# Patient Record
Sex: FEMALE | Race: WHITE | NOT HISPANIC OR LATINO
[De-identification: names, ages, dates, MRNs, and addresses within clinical notes are randomized per-mention and may not be internally consistent; named-entity substitution may affect disease eponyms.]

---

## 2021-12-03 ENCOUNTER — TRANSCRIPTION ENCOUNTER (OUTPATIENT)
Age: 66
End: 2021-12-03

## 2021-12-09 PROBLEM — Z00.00 ENCOUNTER FOR PREVENTIVE HEALTH EXAMINATION: Status: ACTIVE | Noted: 2021-12-09

## 2021-12-16 ENCOUNTER — APPOINTMENT (OUTPATIENT)
Dept: PHYSICAL MEDICINE AND REHAB | Facility: CLINIC | Age: 66
End: 2021-12-16

## 2021-12-20 ENCOUNTER — APPOINTMENT (OUTPATIENT)
Dept: PHYSICAL MEDICINE AND REHAB | Facility: CLINIC | Age: 66
End: 2021-12-20

## 2021-12-20 VITALS
DIASTOLIC BLOOD PRESSURE: 80 MMHG | SYSTOLIC BLOOD PRESSURE: 130 MMHG | OXYGEN SATURATION: 100 % | HEART RATE: 78 BPM | TEMPERATURE: 98.3 F

## 2021-12-20 DIAGNOSIS — Z85.820 PERSONAL HISTORY OF MALIGNANT MELANOMA OF SKIN: ICD-10-CM

## 2021-12-20 DIAGNOSIS — E78.5 HYPERLIPIDEMIA, UNSPECIFIED: ICD-10-CM

## 2021-12-20 DIAGNOSIS — I10 ESSENTIAL (PRIMARY) HYPERTENSION: ICD-10-CM

## 2021-12-20 RX ORDER — ESTRADIOL 10 UG/1
10 TABLET VAGINAL
Qty: 24 | Refills: 0 | Status: ACTIVE | COMMUNITY
Start: 2021-07-28

## 2021-12-20 RX ORDER — TELMISARTAN 80 MG/1
80 TABLET ORAL
Refills: 0 | Status: DISCONTINUED | COMMUNITY
End: 2021-12-20

## 2021-12-20 RX ORDER — CYCLOBENZAPRINE HCL 10 MG
10 TABLET ORAL
Refills: 0 | Status: ACTIVE | COMMUNITY

## 2021-12-20 RX ORDER — PHENAZOPYRIDINE HYDROCHLORIDE 200 MG/1
200 TABLET ORAL
Qty: 6 | Refills: 0 | Status: ACTIVE | COMMUNITY
Start: 2021-06-27

## 2021-12-20 RX ORDER — NITROFURANTOIN (MONOHYDRATE/MACROCRYSTALS) 25; 75 MG/1; MG/1
100 CAPSULE ORAL
Qty: 14 | Refills: 0 | Status: ACTIVE | COMMUNITY
Start: 2021-06-27

## 2021-12-20 RX ORDER — LIDOCAINE HYDROCHLORIDE 10 MG/ML
1 INJECTION, SOLUTION INFILTRATION; PERINEURAL
Refills: 0 | Status: COMPLETED | OUTPATIENT
Start: 2021-12-20

## 2021-12-20 RX ORDER — TELMISARTAN 40 MG/1
40 TABLET ORAL
Qty: 90 | Refills: 0 | Status: ACTIVE | COMMUNITY
Start: 2021-11-23

## 2021-12-20 RX ORDER — AMLODIPINE BESYLATE AND BENAZEPRIL HYDROCHLORIDE 5; 20 MG/1; MG/1
5-20 CAPSULE ORAL
Qty: 90 | Refills: 0 | Status: ACTIVE | COMMUNITY
Start: 2021-11-12

## 2021-12-20 RX ORDER — MELOXICAM 15 MG/1
15 TABLET ORAL
Refills: 0 | Status: ACTIVE | COMMUNITY

## 2021-12-20 RX ORDER — ROSUVASTATIN CALCIUM 5 MG/1
5 TABLET, FILM COATED ORAL
Refills: 0 | Status: ACTIVE | COMMUNITY

## 2021-12-20 RX ORDER — AMLODIPINE AND ATORVASTATIN 2.5; 4 MG/1; MG/1
TABLET, COATED ORAL
Refills: 0 | Status: DISCONTINUED | COMMUNITY
End: 2021-12-20

## 2021-12-20 RX ADMIN — LIDOCAINE HYDROCHLORIDE 10 %: 10 INJECTION, SOLUTION INFILTRATION; PERINEURAL at 00:00

## 2022-03-14 ENCOUNTER — APPOINTMENT (OUTPATIENT)
Dept: PHYSICAL MEDICINE AND REHAB | Facility: CLINIC | Age: 67
End: 2022-03-14

## 2022-03-14 VITALS
OXYGEN SATURATION: 99 % | HEART RATE: 72 BPM | TEMPERATURE: 97.8 F | DIASTOLIC BLOOD PRESSURE: 79 MMHG | SYSTOLIC BLOOD PRESSURE: 128 MMHG

## 2022-03-14 DIAGNOSIS — M70.50 OTHER BURSITIS OF KNEE, UNSPECIFIED KNEE: ICD-10-CM

## 2022-03-14 DIAGNOSIS — M17.11 UNILATERAL PRIMARY OSTEOARTHRITIS, RIGHT KNEE: ICD-10-CM

## 2022-03-14 RX ORDER — MELOXICAM 15 MG/1
15 TABLET ORAL DAILY
Qty: 30 | Refills: 0 | Status: ACTIVE | COMMUNITY
Start: 2022-03-14 | End: 1900-01-01

## 2022-03-14 NOTE — HISTORY OF PRESENT ILLNESS
[FreeTextEntry1] : Pt presents in my office today for Eval of right knee pain. Pt states she fell injuring her right knee on 12/29/2021. Since that time she has been experiencing right knee pain with limited range of motion.

## 2022-03-14 NOTE — ASSESSMENT
[FreeTextEntry1] : Mobic 15 mg po qd with meal#30 \par Pt advised to ice right knee PRN \par HEP \par Recheck in  4 weeks

## 2022-03-14 NOTE — PHYSICAL EXAM
[Normal] : Normal skin color and pigmentation, normal turgor and no rash [FreeTextEntry1] : \par Right Knee Examination \par Upon inspection: Small fluid filled sac anterior to the knee sac no increase temp tender to touch \par Mild tenderness involving the medial joint line. \par \par Flexion: 120 degrees \par Extension:full \par \par Palpation: mild tenderness involving the medial joint line and pre patella bursa is tender \par \par \par Swelling: \par No increase temperature: \par Patella Compression: +\par Klaus Test: -\par Crepitus:-\par Patella Femoral Clicking:-\par Collateral ligaments are intact \par   [de-identified] : see exam  [de-identified] : see exam  [de-identified] : see exam

## 2022-03-21 ENCOUNTER — APPOINTMENT (OUTPATIENT)
Dept: PHYSICAL MEDICINE AND REHAB | Facility: CLINIC | Age: 67
End: 2022-03-21

## 2022-03-21 RX ORDER — LIDOCAINE HYDROCHLORIDE 10 MG/ML
1 INJECTION, SOLUTION INFILTRATION; PERINEURAL
Refills: 0 | Status: COMPLETED | OUTPATIENT
Start: 2022-03-21

## 2022-03-21 RX ADMIN — Medication 10 %: at 00:00

## 2022-06-20 ENCOUNTER — APPOINTMENT (OUTPATIENT)
Dept: PHYSICAL MEDICINE AND REHAB | Facility: CLINIC | Age: 67
End: 2022-06-20

## 2022-06-20 PROCEDURE — 99213 OFFICE O/P EST LOW 20 MIN: CPT | Mod: 25

## 2022-06-20 PROCEDURE — 20553 NJX 1/MLT TRIGGER POINTS 3/>: CPT

## 2022-06-20 RX ORDER — LIDOCAINE HYDROCHLORIDE 10 MG/ML
1 INJECTION, SOLUTION INFILTRATION; PERINEURAL
Refills: 0 | Status: COMPLETED | OUTPATIENT
Start: 2022-06-20

## 2022-07-12 ENCOUNTER — APPOINTMENT (OUTPATIENT)
Dept: PHYSICAL MEDICINE AND REHAB | Facility: CLINIC | Age: 67
End: 2022-07-12

## 2022-07-12 DIAGNOSIS — M70.51 OTHER BURSITIS OF KNEE, RIGHT KNEE: ICD-10-CM

## 2022-07-12 PROCEDURE — 99212 OFFICE O/P EST SF 10 MIN: CPT

## 2022-07-12 NOTE — PHYSICAL EXAM
[FreeTextEntry1] : Examination of the lumbar spine:\par Flexion 60 degrees, extension 30 degrees, right lateral bending 30 degrees left lateral bending 32 degrees right rotation 35 degrees left rotation 35 degrees.  Palpation of the lumbar spine-nontender, trigger points involving right gluteal musculature improved\par Right knee-full extension, flexion 125 swelling involving prepatellar bursa diminished.  No increased temperature.  No gross instability.  Mildly positive patella compression test 28-Sep-2022

## 2022-07-12 NOTE — HISTORY OF PRESENT ILLNESS
[FreeTextEntry1] : Patient reports improvement to right knee with meloxicam.  Low back currently feeling good.

## 2022-11-02 ENCOUNTER — APPOINTMENT (OUTPATIENT)
Dept: PHYSICAL MEDICINE AND REHAB | Facility: CLINIC | Age: 67
End: 2022-11-02

## 2023-01-04 ENCOUNTER — APPOINTMENT (OUTPATIENT)
Dept: PHYSICAL MEDICINE AND REHAB | Facility: CLINIC | Age: 68
End: 2023-01-04
Payer: COMMERCIAL

## 2023-01-04 PROCEDURE — 20553 NJX 1/MLT TRIGGER POINTS 3/>: CPT

## 2023-01-04 PROCEDURE — 99213 OFFICE O/P EST LOW 20 MIN: CPT | Mod: 25

## 2023-01-04 NOTE — PROCEDURE
[de-identified] : Sterile Technique Injection \par 2 Syringes of 5 cc 1 % Lidocaine HCL \par \par Right Gluteus medius \par Right Gluteus dawn \par Right piriformis \par Ice injection site PRN \par Injection tolerated well.\par  \par \par Examination of the Lumbar Spine \par Flexion 55 degrees\par Extension 15 degrees \par Lateral Bend R 30 degrees  L 30 degrees\par Rotation R 35 degrees  L 30 degrees \par \par Palpation of the Lumbar Spine: Trigger points involving the right gluteus musculature. \par \par MMT L/E:Intact\par \par Pt continues with c/o intermittent  low back pain. \par She continues to find trigger point injections beneficial in improving level of function and decreasing pain.

## 2023-01-04 NOTE — PROCEDURE
[de-identified] : Sterile Technique Injection \par 2 Syringes of 5 cc 1 % Lidocaine HCL \par \par Right Gluteus medius \par Right Gluteus dawn \par Right piriformis \par Ice injection site PRN \par Injection tolerated well.\par  \par \par Examination of the Lumbar Spine \par Flexion 55 degrees\par Extension 15 degrees \par Lateral Bend R 30 degrees  L 30 degrees\par Rotation R 35 degrees  L 30 degrees \par \par Palpation of the Lumbar Spine: Trigger points involving the right gluteus musculature. \par \par MMT L/E:Intact\par \par Pt continues with c/o intermittent  low back pain. \par She continues to find trigger point injections beneficial in improving level of function and decreasing pain.

## 2023-02-17 ENCOUNTER — NON-APPOINTMENT (OUTPATIENT)
Age: 68
End: 2023-02-17

## 2023-08-09 ENCOUNTER — APPOINTMENT (OUTPATIENT)
Dept: PHYSICAL MEDICINE AND REHAB | Facility: CLINIC | Age: 68
End: 2023-08-09
Payer: MEDICARE

## 2023-08-09 PROCEDURE — J3490M: CUSTOM | Mod: NC

## 2023-08-09 PROCEDURE — 99213 OFFICE O/P EST LOW 20 MIN: CPT | Mod: 25

## 2023-08-09 PROCEDURE — 20553 NJX 1/MLT TRIGGER POINTS 3/>: CPT

## 2023-08-09 NOTE — PROCEDURE
[de-identified] : Pt presents in my office today for trigger point injection to the L/S  Pt continues with c/o intermittent low back pain.  She denies radicular symptoms.   Sterile Technique Injection  2 Syringes of 5 cc 1 % Lidocaine HCL   Bilateral Gluteus Medius  Bilateral Gluteus dawn   Ice injection site PRN  Injection tolerated well.    Examination of the Lumbar Spine  Flexion 50 degrees Extension 15 degrees  Lateral Bend R 25 degrees  L 30 degrees Rotation R 35 degrees L 30 degrees   Palpation of the Lumbar Spine: Trigger points involving the bilateral gluteal musculature.  SLR - 70 degrees bilaterally  Reflexes 2 and symmetrical throughout  MMT L/E:Intact  Pt continues with c/o intermittent low back pain.  She continues to find trigger point injections beneficial in improving level of function and decreasing pain.

## 2023-08-22 ENCOUNTER — APPOINTMENT (OUTPATIENT)
Dept: PHYSICAL MEDICINE AND REHAB | Facility: CLINIC | Age: 68
End: 2023-08-22

## 2023-11-09 ENCOUNTER — APPOINTMENT (OUTPATIENT)
Dept: PHYSICAL MEDICINE AND REHAB | Facility: CLINIC | Age: 68
End: 2023-11-09
Payer: MEDICARE

## 2023-11-09 PROCEDURE — 99213 OFFICE O/P EST LOW 20 MIN: CPT | Mod: 25

## 2023-11-09 PROCEDURE — 20553 NJX 1/MLT TRIGGER POINTS 3/>: CPT

## 2023-11-27 ENCOUNTER — APPOINTMENT (OUTPATIENT)
Dept: PHYSICAL MEDICINE AND REHAB | Facility: CLINIC | Age: 68
End: 2023-11-27
Payer: MEDICARE

## 2023-11-27 PROCEDURE — 99213 OFFICE O/P EST LOW 20 MIN: CPT | Mod: 25

## 2023-11-27 PROCEDURE — 20553 NJX 1/MLT TRIGGER POINTS 3/>: CPT

## 2024-02-05 ENCOUNTER — APPOINTMENT (OUTPATIENT)
Dept: PHYSICAL MEDICINE AND REHAB | Facility: CLINIC | Age: 69
End: 2024-02-05
Payer: MEDICARE

## 2024-02-05 DIAGNOSIS — M54.50 LOW BACK PAIN, UNSPECIFIED: ICD-10-CM

## 2024-02-05 DIAGNOSIS — M54.59 OTHER LOW BACK PAIN: ICD-10-CM

## 2024-02-05 DIAGNOSIS — G89.29 LOW BACK PAIN, UNSPECIFIED: ICD-10-CM

## 2024-02-05 PROCEDURE — 99213 OFFICE O/P EST LOW 20 MIN: CPT | Mod: 25

## 2024-02-05 PROCEDURE — 20553 NJX 1/MLT TRIGGER POINTS 3/>: CPT

## 2024-02-06 PROBLEM — M54.50 CHRONIC BILATERAL LOW BACK PAIN WITHOUT SCIATICA: Status: ACTIVE | Noted: 2023-11-09

## 2024-02-06 PROBLEM — M54.59 OTHER LOW BACK PAIN: Status: ACTIVE | Noted: 2022-06-21

## 2024-02-06 NOTE — HISTORY OF PRESENT ILLNESS
[FreeTextEntry1] : Patient presents for evaluation of Lumbar Spine pain.  Patient reports overall doing well however over the past week has been experiencing some increasing right-sided low back pain denies radicular symptoms.   Examination of the Lumbar Spine:   Flexion:55 degrees (normal -75-90  )  Extension: 20  degrees (normal - 30)  Lateral Bending ( R ):   25 degrees  (normal - 35) Lateral Bending ( L ):   30  degrees (normal - 35) Thoracic Rotation ( R ):    35 degrees (normal - 35) Thoracic Rotation ( L ):     35  degrees (normal - 35)     MMT: intact  Sensory L/E: intact  S.L.R. ( R ) - 70 degrees S.L.R. ( L )-70 degrees    Palpation: Isolated trigger points were identified involving the right gluteus dawn and medius musculature     After today's examination additional trigger points were identified involving the right gluteus dawn and medius musculature  thus indicating the need for additional trigger point therapy.  Goals of which are to decrease pain, dissipate muscle spasm, increase ROM and improve level of function.     Sterile Technique Injection 2 Syringes of 5 cc 1 % Lidocaine HCL  right gluteus dawn and medius musculature     Ice injection site PRN   Injection tolerated well.     Multiple areas were identified using palpation guidance. Using aseptic technique, each of these areas right gluteus dawn and medius musculature  were isolated and the skin prepped with alcohol.  A 22 gauge 1 1/2 inch needle was inserted to the level of the muscle. At this point, a slight twitch was elicited and in some cases the patient identified referred pain to areas distant from the injection site.  All of these were consistent with the definition of a trigger point.   Aspiration revealed no blood and a mixture of 5cc 1 % Lidocaine HCL was injected in increments of  3 to 4 mL into each of these areas for a total of 2  sites. The needle was removed. Hemostasis was achieved with direct pressure.  The patient tolerated the procedure well. Post-procedure exam did not reveal any new neurological deficits. The patient was instructed to call with fever, chills, increased pain, redness or swelling at the injection site, or numbness or weakness in the lower extremities. The patient was discharged home in good condition with post-procedural instructions.     At least 20 minutes were spent with patient face to face for the physical examination with the purpose of identifying additional and/or separate trigger point locations and reviewing findings/results.  Ample time was provided to answer any questions or address concerns to the patient's satisfaction. Recheck 1- 2 weeks.

## 2024-03-18 ENCOUNTER — APPOINTMENT (OUTPATIENT)
Dept: PHYSICAL MEDICINE AND REHAB | Facility: CLINIC | Age: 69
End: 2024-03-18
Payer: MEDICARE

## 2024-03-18 DIAGNOSIS — M62.838 OTHER MUSCLE SPASM: ICD-10-CM

## 2024-03-18 DIAGNOSIS — M51.36 OTHER INTERVERTEBRAL DISC DEGENERATION, LUMBAR REGION: ICD-10-CM

## 2024-03-18 DIAGNOSIS — M51.9 UNSPECIFIED THORACIC, THORACOLUMBAR AND LUMBOSACRAL INTERVERTEBRAL DISC DISORDER: ICD-10-CM

## 2024-03-18 PROCEDURE — 99213 OFFICE O/P EST LOW 20 MIN: CPT

## 2024-03-18 RX ORDER — CYCLOBENZAPRINE HYDROCHLORIDE 5 MG/1
5 TABLET, FILM COATED ORAL 3 TIMES DAILY
Qty: 30 | Refills: 0 | Status: ACTIVE | COMMUNITY
Start: 2024-03-18 | End: 1900-01-01

## 2024-03-18 NOTE — PHYSICAL EXAM
[FreeTextEntry1] : Examination of the Lumbar Spine:  Flexion: 60  degrees (normal -75-90 ) Extension: 20 degrees (normal - 30) Lateral Bending ( R ): 25 degrees (normal - 35) Lateral Bending ( L ):  25 degrees (normal - 35) Thoracic Rotation ( R ): 35 degrees (normal - 35) Thoracic Rotation ( L ): 35 degrees (normal - 35)   MMT: intact Sensory L/E: intact S.L.R. ( R ) - 70 degrees S.L.R. ( L )-70 degrees  Palpation: Mild tenderness and spasm involving the bilateral lower lumbar paraspinal musculature

## 2024-03-18 NOTE — HISTORY OF PRESENT ILLNESS
[FreeTextEntry1] : Patient continues to report intermittent low back pain.  Reports experiencing exacerbation last week after his zip lining she has been using Tylenol arthritis and applying moist heat.   has been noting improvement

## 2024-03-18 NOTE — ASSESSMENT
[FreeTextEntry1] : Home exercise plan reviewed with patient. Stressed the importance for the need for frequent change in position from sit to stand and stand to sit, as well as use of weight shifting techniques to alleviate low back discomfort. Instruction in proper posturing, body mechanics and lifting techniques.  Flexeril 5mg po TID #30 PRN   Moist heat for muscle relaxation  Recheck 4 weeks if symptoms persist will consider TPI therapy

## 2024-07-08 ENCOUNTER — APPOINTMENT (OUTPATIENT)
Dept: PHYSICAL MEDICINE AND REHAB | Facility: CLINIC | Age: 69
End: 2024-07-08

## 2024-12-23 ENCOUNTER — APPOINTMENT (OUTPATIENT)
Dept: PHYSICAL MEDICINE AND REHAB | Facility: CLINIC | Age: 69
End: 2024-12-23
Payer: MEDICARE

## 2024-12-23 DIAGNOSIS — M51.369: ICD-10-CM

## 2024-12-23 DIAGNOSIS — G89.29 LOW BACK PAIN, UNSPECIFIED: ICD-10-CM

## 2024-12-23 DIAGNOSIS — M54.50 LOW BACK PAIN, UNSPECIFIED: ICD-10-CM

## 2024-12-23 DIAGNOSIS — G89.29 MYALGIA, OTHER SITE: ICD-10-CM

## 2024-12-23 DIAGNOSIS — M79.18 MYALGIA, OTHER SITE: ICD-10-CM

## 2024-12-23 DIAGNOSIS — M51.9 UNSPECIFIED THORACIC, THORACOLUMBAR AND LUMBOSACRAL INTERVERTEBRAL DISC DISORDER: ICD-10-CM

## 2024-12-23 DIAGNOSIS — M17.11 UNILATERAL PRIMARY OSTEOARTHRITIS, RIGHT KNEE: ICD-10-CM

## 2024-12-23 DIAGNOSIS — M62.838 OTHER MUSCLE SPASM: ICD-10-CM

## 2024-12-23 PROCEDURE — 20553 NJX 1/MLT TRIGGER POINTS 3/>: CPT

## 2024-12-23 PROCEDURE — 99213 OFFICE O/P EST LOW 20 MIN: CPT | Mod: 25

## 2025-02-25 ENCOUNTER — APPOINTMENT (OUTPATIENT)
Dept: PHYSICAL MEDICINE AND REHAB | Facility: CLINIC | Age: 70
End: 2025-02-25
Payer: MEDICARE

## 2025-02-25 DIAGNOSIS — M79.10 MYALGIA, UNSPECIFIED SITE: ICD-10-CM

## 2025-02-25 PROCEDURE — 99213 OFFICE O/P EST LOW 20 MIN: CPT | Mod: 25

## 2025-02-25 PROCEDURE — 20553 NJX 1/MLT TRIGGER POINTS 3/>: CPT

## 2025-02-25 RX ORDER — CYCLOBENZAPRINE HYDROCHLORIDE 5 MG/1
5 TABLET, FILM COATED ORAL 3 TIMES DAILY
Qty: 30 | Refills: 0 | Status: ACTIVE | COMMUNITY
Start: 2025-02-25 | End: 1900-01-01

## 2025-02-25 RX ORDER — MELOXICAM 15 MG/1
15 TABLET ORAL DAILY
Qty: 30 | Refills: 0 | Status: ACTIVE | COMMUNITY
Start: 2025-02-25 | End: 1900-01-01

## 2025-04-08 ENCOUNTER — APPOINTMENT (OUTPATIENT)
Dept: PHYSICAL MEDICINE AND REHAB | Facility: CLINIC | Age: 70
End: 2025-04-08

## 2025-04-15 ENCOUNTER — APPOINTMENT (OUTPATIENT)
Dept: PHYSICAL MEDICINE AND REHAB | Facility: CLINIC | Age: 70
End: 2025-04-15
Payer: MEDICARE

## 2025-04-15 DIAGNOSIS — M54.50 LOW BACK PAIN, UNSPECIFIED: ICD-10-CM

## 2025-04-15 DIAGNOSIS — M51.9 UNSPECIFIED THORACIC, THORACOLUMBAR AND LUMBOSACRAL INTERVERTEBRAL DISC DISORDER: ICD-10-CM

## 2025-04-15 DIAGNOSIS — M79.10 MYALGIA, UNSPECIFIED SITE: ICD-10-CM

## 2025-04-15 DIAGNOSIS — M51.369: ICD-10-CM

## 2025-04-15 DIAGNOSIS — G89.29 LOW BACK PAIN, UNSPECIFIED: ICD-10-CM

## 2025-04-15 DIAGNOSIS — G89.29 MYALGIA, OTHER SITE: ICD-10-CM

## 2025-04-15 DIAGNOSIS — M79.18 MYALGIA, OTHER SITE: ICD-10-CM

## 2025-04-15 PROCEDURE — 99213 OFFICE O/P EST LOW 20 MIN: CPT

## 2025-05-06 ENCOUNTER — APPOINTMENT (OUTPATIENT)
Dept: PHYSICAL MEDICINE AND REHAB | Facility: CLINIC | Age: 70
End: 2025-05-06
Payer: MEDICARE

## 2025-05-06 DIAGNOSIS — G89.29 LOW BACK PAIN, UNSPECIFIED: ICD-10-CM

## 2025-05-06 DIAGNOSIS — M79.10 MYALGIA, UNSPECIFIED SITE: ICD-10-CM

## 2025-05-06 DIAGNOSIS — M54.50 LOW BACK PAIN, UNSPECIFIED: ICD-10-CM

## 2025-05-06 PROCEDURE — 20553 NJX 1/MLT TRIGGER POINTS 3/>: CPT

## 2025-05-06 PROCEDURE — 99213 OFFICE O/P EST LOW 20 MIN: CPT | Mod: 25
